# Patient Record
Sex: FEMALE | Race: WHITE | Employment: FULL TIME | URBAN - METROPOLITAN AREA
[De-identification: names, ages, dates, MRNs, and addresses within clinical notes are randomized per-mention and may not be internally consistent; named-entity substitution may affect disease eponyms.]

---

## 2017-02-23 ENCOUNTER — OFFICE VISIT (OUTPATIENT)
Dept: SURGERY | Facility: CLINIC | Age: 32
End: 2017-02-23

## 2017-02-23 VITALS
DIASTOLIC BLOOD PRESSURE: 80 MMHG | WEIGHT: 139 LBS | BODY MASS INDEX: 24 KG/M2 | HEART RATE: 74 BPM | TEMPERATURE: 98 F | SYSTOLIC BLOOD PRESSURE: 119 MMHG | RESPIRATION RATE: 16 BRPM

## 2017-02-23 DIAGNOSIS — K62.89 ANAL PAIN: ICD-10-CM

## 2017-02-23 DIAGNOSIS — K64.4 EXTERNAL PROLAPSED HEMORRHOIDS: ICD-10-CM

## 2017-02-23 DIAGNOSIS — K60.1 CHRONIC POSTERIOR ANAL FISSURE: Primary | ICD-10-CM

## 2017-02-23 PROCEDURE — 99243 OFF/OP CNSLTJ NEW/EST LOW 30: CPT | Performed by: COLON & RECTAL SURGERY

## 2017-02-23 RX ORDER — ERGOCALCIFEROL 1.25 MG/1
CAPSULE ORAL
Refills: 1 | COMMUNITY
Start: 2017-02-09 | End: 2018-09-06

## 2017-02-23 RX ORDER — FOLIC ACID 1 MG/1
TABLET ORAL
Refills: 0 | COMMUNITY
Start: 2017-01-19 | End: 2018-09-06

## 2017-02-23 NOTE — H&P
New Patient Visit Note       Active Problems      1. Chronic posterior anal fissure    2. External prolapsed hemorrhoids    3. Anal pain        Chief Complaint   Patient presents with:  Anal Problem (GI): NP self ref, here for anal skin tag x 10 years.  Pt Annie Mendez Maternal Grandmother    • Migraines Sister    • endometriosis[other] Kristine Rodrigues Sister    • Stroke Maternal Grandfather    • Hypertension Father    • Heart Attack Paternal Grandfather    • Prostate Cancer Paternal Grandfather    • lung ca Cardiovascular: Negative for chest pain, palpitations and leg swelling. Gastrointestinal: Negative for nausea, vomiting, abdominal pain, diarrhea, constipation, blood in stool, abdominal distention and anal bleeding.    Genitourinary: Negative for dysur reviewed. Assessment   Chronic posterior anal fissure  (primary encounter diagnosis)  External prolapsed hemorrhoids  Anal pain      Plan   This patient presents for severe anal pain.   She also has an external hemorrhoid that has been present for sphincterotomy. Although this does not affect the powerful external sphincter, it does further weaken the process when it comes to control of stool. Hopefully she will respond to the nitroglycerin ointment.   We will see her in 6 weeks to come to conclu

## 2017-02-24 PROBLEM — K64.4 EXTERNAL PROLAPSED HEMORRHOIDS: Status: ACTIVE | Noted: 2017-02-24

## 2017-02-24 PROBLEM — K60.1 CHRONIC POSTERIOR ANAL FISSURE: Status: ACTIVE | Noted: 2017-02-24

## 2017-02-24 PROBLEM — K62.89 ANAL PAIN: Status: ACTIVE | Noted: 2017-02-24

## 2017-02-24 NOTE — PATIENT INSTRUCTIONS
This patient presents for severe anal pain. She also has an external hemorrhoid that has been present for 10 years. She states that she has severe pain with bowel movements. She also sees bleeding during bowel movements.   She also has severe itching stool. Hopefully she will respond to the nitroglycerin ointment. We will see her in 6 weeks to come to conclusion as to whether medical management has fixed her problems.

## 2018-02-11 ENCOUNTER — CHARTING TRANS (OUTPATIENT)
Dept: OTHER | Age: 33
End: 2018-02-11

## 2018-02-12 ENCOUNTER — CHARTING TRANS (OUTPATIENT)
Dept: OTHER | Age: 33
End: 2018-02-12

## 2018-02-14 ENCOUNTER — CHARTING TRANS (OUTPATIENT)
Dept: OTHER | Age: 33
End: 2018-02-14

## 2018-11-01 VITALS
HEART RATE: 81 BPM | TEMPERATURE: 98.6 F | OXYGEN SATURATION: 98 % | HEIGHT: 64 IN | RESPIRATION RATE: 16 BRPM | DIASTOLIC BLOOD PRESSURE: 78 MMHG | SYSTOLIC BLOOD PRESSURE: 112 MMHG | WEIGHT: 149.36 LBS | BODY MASS INDEX: 25.5 KG/M2

## 2019-07-25 PROBLEM — N80.9 ENDOMETRIOSIS: Status: ACTIVE | Noted: 2019-07-25

## (undated) NOTE — MR AVS SNAPSHOT
[facility] [place] [facility]  [address] [place] [address] [address]  [phone]               Thank you for choosing us for your health care visit with Brett Gallardo MD.  We are glad to serve you and happy to provide you with this summary of you Clinical examination including digital rectal exam reveals her to have a very well-developed posterior midline chronic anal fissure. There is an external hemorrhoid and tag associated with the fissure that is quite large, 15 mm.   The anal sphincter is wea Commonly known as:  ADDERALL           CYMBALTA 30 MG Cpep   Generic drug:  DULoxetine HCl   Take 30 mg by mouth daily.            ergocalciferol 36184 units Caps   TK ONE C PO  ONCE A WEEK   Commonly known as:  DRISDOL/VITAMIN D2           folic acid 1 MG

## (undated) NOTE — Clinical Note
2017    Patient: Brenda Busch  : 1985 Visit date: 2017    Dear  Dr. Azael Cooely MD,    Thank you for referring Brenda Busch to my practice. Please find my assessment and plan below.            Assessment   Chronic posterior anal f We will start nitroglycerin ointment. I will see her in 6 weeks. These can be difficult fissures to heal because the surgical option may not be viable secondary to the weak anal sphincter.   Surgical option is for a subcutaneous lateral internal sphinct